# Patient Record
Sex: FEMALE | Race: ASIAN | Employment: UNEMPLOYED
[De-identification: names, ages, dates, MRNs, and addresses within clinical notes are randomized per-mention and may not be internally consistent; named-entity substitution may affect disease eponyms.]

---

## 2023-12-30 ENCOUNTER — HOSPITAL ENCOUNTER (EMERGENCY)
Facility: HOSPITAL | Age: 33
Discharge: HOME | End: 2023-12-30
Attending: STUDENT IN AN ORGANIZED HEALTH CARE EDUCATION/TRAINING PROGRAM
Payer: OTHER GOVERNMENT

## 2023-12-30 VITALS
BODY MASS INDEX: 24.75 KG/M2 | OXYGEN SATURATION: 98 % | WEIGHT: 145 LBS | RESPIRATION RATE: 18 BRPM | SYSTOLIC BLOOD PRESSURE: 119 MMHG | DIASTOLIC BLOOD PRESSURE: 74 MMHG | HEART RATE: 94 BPM | HEIGHT: 64 IN | TEMPERATURE: 98.4 F

## 2023-12-30 DIAGNOSIS — R06.2 WHEEZING: ICD-10-CM

## 2023-12-30 DIAGNOSIS — J06.9 UPPER RESPIRATORY TRACT INFECTION, UNSPECIFIED TYPE: Primary | ICD-10-CM

## 2023-12-30 PROCEDURE — 6370000000 HC RX 637 (ALT 250 FOR IP): Performed by: STUDENT IN AN ORGANIZED HEALTH CARE EDUCATION/TRAINING PROGRAM

## 2023-12-30 PROCEDURE — 99283 EMERGENCY DEPT VISIT LOW MDM: CPT

## 2023-12-30 RX ORDER — ALBUTEROL SULFATE 90 UG/1
2 AEROSOL, METERED RESPIRATORY (INHALATION) 4 TIMES DAILY PRN
Qty: 18 G | Refills: 0 | Status: SHIPPED | OUTPATIENT
Start: 2023-12-30

## 2023-12-30 RX ORDER — IPRATROPIUM BROMIDE AND ALBUTEROL SULFATE 2.5; .5 MG/3ML; MG/3ML
1 SOLUTION RESPIRATORY (INHALATION)
Status: COMPLETED | OUTPATIENT
Start: 2023-12-30 | End: 2023-12-30

## 2023-12-30 RX ORDER — PREDNISONE 50 MG/1
50 TABLET ORAL DAILY
Qty: 5 TABLET | Refills: 0 | Status: SHIPPED | OUTPATIENT
Start: 2023-12-30 | End: 2024-01-04

## 2023-12-30 RX ORDER — DOXYCYCLINE HYCLATE 100 MG
100 TABLET ORAL 2 TIMES DAILY
Qty: 14 TABLET | Refills: 0 | Status: SHIPPED | OUTPATIENT
Start: 2023-12-30 | End: 2024-01-06

## 2023-12-30 RX ORDER — PREDNISONE 20 MG/1
60 TABLET ORAL
Status: COMPLETED | OUTPATIENT
Start: 2023-12-30 | End: 2023-12-30

## 2023-12-30 RX ADMIN — IPRATROPIUM BROMIDE AND ALBUTEROL SULFATE 1 DOSE: 2.5; .5 SOLUTION RESPIRATORY (INHALATION) at 13:50

## 2023-12-30 RX ADMIN — PREDNISONE 60 MG: 20 TABLET ORAL at 13:50

## 2023-12-30 ASSESSMENT — LIFESTYLE VARIABLES
HOW MANY STANDARD DRINKS CONTAINING ALCOHOL DO YOU HAVE ON A TYPICAL DAY: 1 OR 2
HOW OFTEN DO YOU HAVE A DRINK CONTAINING ALCOHOL: MONTHLY OR LESS

## 2023-12-30 ASSESSMENT — PAIN - FUNCTIONAL ASSESSMENT: PAIN_FUNCTIONAL_ASSESSMENT: NONE - DENIES PAIN

## 2023-12-30 NOTE — ED TRIAGE NOTES
Pt reports cough that began 1 week ago and now with sore throat x 2 days. Pt thinks sore throat is related to inhaler she has been using that is . Requesting new script for inhaler.

## 2024-01-31 ENCOUNTER — HOSPITAL ENCOUNTER (EMERGENCY)
Facility: HOSPITAL | Age: 34
Discharge: HOME | End: 2024-01-31
Payer: OTHER GOVERNMENT

## 2024-01-31 VITALS
SYSTOLIC BLOOD PRESSURE: 124 MMHG | DIASTOLIC BLOOD PRESSURE: 85 MMHG | OXYGEN SATURATION: 100 % | HEART RATE: 99 BPM | TEMPERATURE: 98.8 F | HEIGHT: 64 IN | BODY MASS INDEX: 25.61 KG/M2 | RESPIRATION RATE: 16 BRPM | WEIGHT: 150 LBS

## 2024-01-31 DIAGNOSIS — L30.9 DERMATITIS: Primary | ICD-10-CM

## 2024-01-31 PROCEDURE — 99283 EMERGENCY DEPT VISIT LOW MDM: CPT

## 2024-01-31 RX ORDER — PREDNISONE 20 MG/1
40 TABLET ORAL DAILY
Qty: 10 TABLET | Refills: 0 | Status: SHIPPED | OUTPATIENT
Start: 2024-01-31 | End: 2024-02-05

## 2024-01-31 RX ORDER — TRIAMCINOLONE ACETONIDE 1 MG/G
CREAM TOPICAL
Qty: 15 G | Refills: 0 | Status: SHIPPED | OUTPATIENT
Start: 2024-01-31

## 2024-01-31 ASSESSMENT — PAIN - FUNCTIONAL ASSESSMENT: PAIN_FUNCTIONAL_ASSESSMENT: 0-10

## 2024-01-31 ASSESSMENT — PAIN SCALES - GENERAL: PAINLEVEL_OUTOF10: 0

## 2024-01-31 NOTE — ED TRIAGE NOTES
Rash started after antibiotics where finished for asthma,  approx 4 wks ago.  Rash is to both axillary, then started on inner thigh, used vicks vapor on axillary area says it soothed it and then got worse.  No airway issue, cough x 2-3 months.  Pt doesn't wear deodorant.  Benadryl few wks ago and didn't helped only took 2 times. Light expiratory wheeze to all lung fields

## 2024-02-03 NOTE — ED PROVIDER NOTES
[facility] EMERGENCY DEPT  EMERGENCY DEPARTMENT HISTORY AND PHYSICAL EXAM      Date: 1/31/2024  Patient Name: Radha Sr  MRN: [id number]  YOB: 1990  Date of evaluation: 1/31/2024  Provider: ORVILLE Cuevas NP   Note Started: 2:34 PM EST 2/3/24    HISTORY OF PRESENT ILLNESS     Chief Complaint   Patient presents with    Rash       History Provided By: Patient    HPI: Radha Sr is a 33 y.o. female who reports no past medical history, presents ambulatory to the emergency department complaints of rash to her bilateral axilla and groin after taking antibiotics for a URI.  She has been complaining of the rash and itchiness for several weeks, worse over the last several days.  Denies using any new products such as detergents, lotions, deodorants or anything else to these areas with the rash is located.  Denies mucosal rashes, fever/chills and is otherwise in her usual state of health. Upon arrival to the ED pt is alert and oriented x 3, well-appearing, and interacting appropriately; no obvious distress noted.      PAST MEDICAL HISTORY   Past Medical History:  History reviewed. No pertinent past medical history.    Past Surgical History:  History reviewed. No pertinent surgical history.    Family History:  History reviewed. No pertinent family history.    Social History:  Social History     Tobacco Use    Smoking status: Never    Smokeless tobacco: Never   Substance Use Topics    Alcohol use: Yes       Allergies:  Allergies   Allergen Reactions    Shellfish-Derived Products        PCP: None, None    Current Meds:   No current facility-administered medications for this encounter.     Current Outpatient Medications   Medication Sig Dispense Refill    triamcinolone (KENALOG) 0.1 % cream Apply topically 2 times daily. 15 g 0    predniSONE (DELTASONE) 20 MG tablet Take 2 tablets by mouth daily for 5 days 10 tablet 0    albuterol sulfate HFA (VENTOLIN HFA) 108 (90 Base) MCG/ACT inhaler Inhale 2 puffs into the

## 2025-02-27 ENCOUNTER — HOSPITAL ENCOUNTER (EMERGENCY)
Facility: HOSPITAL | Age: 35
Discharge: HOME | End: 2025-02-27
Payer: OTHER GOVERNMENT

## 2025-02-27 VITALS
DIASTOLIC BLOOD PRESSURE: 68 MMHG | RESPIRATION RATE: 16 BRPM | HEIGHT: 64 IN | WEIGHT: 150 LBS | OXYGEN SATURATION: 98 % | BODY MASS INDEX: 25.61 KG/M2 | SYSTOLIC BLOOD PRESSURE: 121 MMHG | HEART RATE: 74 BPM | TEMPERATURE: 97.7 F

## 2025-02-27 DIAGNOSIS — T65.91XA ALLERGIC REACTION TO CHEMICAL SUBSTANCE, ACCIDENTAL OR UNINTENTIONAL, INITIAL ENCOUNTER: Primary | ICD-10-CM

## 2025-02-27 PROCEDURE — 99283 EMERGENCY DEPT VISIT LOW MDM: CPT

## 2025-02-27 RX ORDER — LORATADINE 10 MG/1
10 TABLET ORAL DAILY
Qty: 90 TABLET | Refills: 2 | Status: SHIPPED | OUTPATIENT
Start: 2025-02-27 | End: 2025-05-28

## 2025-02-27 RX ORDER — ALBUTEROL SULFATE 90 UG/1
2 INHALANT RESPIRATORY (INHALATION) 4 TIMES DAILY PRN
Qty: 18 G | Refills: 2 | Status: SHIPPED | OUTPATIENT
Start: 2025-02-27

## 2025-02-27 ASSESSMENT — PAIN SCALES - GENERAL: PAINLEVEL_OUTOF10: 4

## 2025-02-27 ASSESSMENT — PAIN DESCRIPTION - DESCRIPTORS: DESCRIPTORS: SORE

## 2025-02-27 ASSESSMENT — PAIN DESCRIPTION - LOCATION: LOCATION: THROAT

## 2025-02-27 ASSESSMENT — PAIN DESCRIPTION - PAIN TYPE: TYPE: ACUTE PAIN

## 2025-02-27 ASSESSMENT — PAIN - FUNCTIONAL ASSESSMENT
PAIN_FUNCTIONAL_ASSESSMENT: 0-10
PAIN_FUNCTIONAL_ASSESSMENT: ACTIVITIES ARE NOT PREVENTED

## 2025-02-27 NOTE — ED PROVIDER NOTES
Continue losartan 100mg and HCTZ 25 mg, with hydralazine 25 mg PO q8h     [facility] EMERGENCY DEPT  EMERGENCY DEPARTMENT HISTORY AND PHYSICAL EXAM      Date: 2/27/2025  Patient Name: Radha Sr  MRN: [id number]  YOB: 1990  Date of evaluation: 2/27/2025  Provider: ORVILLE Rojas NP   Note Started: 2:10 PM EST 2/27/25    HISTORY OF PRESENT ILLNESS     Chief Complaint   Patient presents with    Allergic Reaction       History Provided By: Patient    HPI: Radha Sr is a 34 y.o. female with a past medical history of asthma presents to the ER for possible allergic reaction.  Patient had hives last night with a cough.  Patient states she sprayed a Lysol type  prior to having the reaction.  Patient took Benadryl.  Patient comes in for evaluation.    PAST MEDICAL HISTORY   Past Medical History:  No past medical history on file.    Past Surgical History:  No past surgical history on file.    Family History:  No family history on file.    Social History:  Social History     Tobacco Use    Smoking status: Never    Smokeless tobacco: Never   Substance Use Topics    Alcohol use: Yes       Allergies:  Allergies   Allergen Reactions    Shellfish-Derived Products        PCP: None, None    Current Meds:   No current facility-administered medications for this encounter.     Current Outpatient Medications   Medication Sig Dispense Refill    albuterol sulfate HFA (VENTOLIN HFA) 108 (90 Base) MCG/ACT inhaler Inhale 2 puffs into the lungs 4 times daily as needed for Wheezing 18 g 2    loratadine (CLARITIN) 10 MG tablet Take 1 tablet by mouth daily 90 tablet 2    triamcinolone (KENALOG) 0.1 % cream Apply topically 2 times daily. 15 g 0       Social Determinants of Health:   Social Determinants of Health     Tobacco Use: Low Risk  (1/31/2024)    Patient History     Smoking Tobacco Use: Never     Smokeless Tobacco Use: Never     Passive Exposure: Not on file   Alcohol Use: Not At Risk (2/27/2025)    AUDIT-C     Frequency of Alcohol Consumption: Monthly or less     Average Number of

## 2025-02-27 NOTE — ED TRIAGE NOTES
Pt presents to ED reporting having an allergic reaction last night. Pt used a lysol type  and sprayed in on her bed and then last night had hives, wheezing and itching. Pt took two benadryl last night. Pt now reporting a sore throat this morning. Pt took her albuterol inhaler prior to arrival.

## 2025-04-04 ENCOUNTER — APPOINTMENT (OUTPATIENT)
Facility: HOSPITAL | Age: 35
End: 2025-04-04
Payer: OTHER GOVERNMENT

## 2025-04-04 ENCOUNTER — HOSPITAL ENCOUNTER (EMERGENCY)
Facility: HOSPITAL | Age: 35
Discharge: HOME | End: 2025-04-04
Attending: EMERGENCY MEDICINE
Payer: OTHER GOVERNMENT

## 2025-04-04 VITALS
RESPIRATION RATE: 18 BRPM | HEIGHT: 63 IN | OXYGEN SATURATION: 100 % | DIASTOLIC BLOOD PRESSURE: 95 MMHG | TEMPERATURE: 98.1 F | SYSTOLIC BLOOD PRESSURE: 121 MMHG | BODY MASS INDEX: 27.46 KG/M2 | HEART RATE: 82 BPM | WEIGHT: 155 LBS

## 2025-04-04 DIAGNOSIS — S62.645A CLOSED NONDISPLACED FRACTURE OF PROXIMAL PHALANX OF LEFT RING FINGER, INITIAL ENCOUNTER: Primary | ICD-10-CM

## 2025-04-04 PROCEDURE — 73130 X-RAY EXAM OF HAND: CPT

## 2025-04-04 PROCEDURE — 99283 EMERGENCY DEPT VISIT LOW MDM: CPT

## 2025-04-04 RX ORDER — IBUPROFEN 600 MG/1
600 TABLET, FILM COATED ORAL 3 TIMES DAILY PRN
Qty: 30 TABLET | Refills: 0 | Status: SHIPPED | OUTPATIENT
Start: 2025-04-04

## 2025-04-04 ASSESSMENT — PAIN DESCRIPTION - DESCRIPTORS: DESCRIPTORS: ACHING

## 2025-04-04 ASSESSMENT — PAIN - FUNCTIONAL ASSESSMENT: PAIN_FUNCTIONAL_ASSESSMENT: 0-10

## 2025-04-04 ASSESSMENT — PAIN SCALES - GENERAL: PAINLEVEL_OUTOF10: 5

## 2025-04-04 NOTE — ED PROVIDER NOTES
[facility] EMERGENCY DEPT  EMERGENCY DEPARTMENT HISTORY AND PHYSICAL EXAM      Date: 4/4/2025  Patient Name: Radha Sr  MRN: [id number]  YOB: 1990  Date of evaluation: 4/4/2025  Provider: Norman Chester DO   Note Started: 5:56 PM EDT 4/4/25    HISTORY OF PRESENT ILLNESS     Chief Complaint   Patient presents with    Finger Injury       History was provided by: Patient    HPI:Patient is a 35-year-old female presenting with a left hand and finger injury.  She says she was at [employer], a [employer], and she got it caught on a metal chain that was attached to a climbing apparatus.  She obtained a splint and put on her fourth finger via [facility].  Pain is worst in the 3rd and 4th digits and at the palm of the hand near the 3rd and 4th MCP joints.  Pain is limiting her range of motion.  She does not know if it is broken, sprained, or dislocated.          PAST MEDICAL HISTORY   Past Medical History:  No past medical history on file.    Past Surgical History:  No past surgical history on file.    Family History:  No family history on file.    Social History:  Social History     Tobacco Use    Smoking status: Never    Smokeless tobacco: Never   Substance Use Topics    Alcohol use: Yes       Allergies:  Allergies   Allergen Reactions    Shellfish-Derived Products        PCP: None, None    Current Meds:   No current facility-administered medications for this encounter.     Current Outpatient Medications   Medication Sig Dispense Refill    ibuprofen (ADVIL;MOTRIN) 600 MG tablet Take 1 tablet by mouth 3 times daily as needed for Pain 30 tablet 0    albuterol sulfate HFA (VENTOLIN HFA) 108 (90 Base) MCG/ACT inhaler Inhale 2 puffs into the lungs 4 times daily as needed for Wheezing 18 g 2    loratadine (CLARITIN) 10 MG tablet Take 1 tablet by mouth daily 90 tablet 2    triamcinolone (KENALOG) 0.1 % cream Apply topically 2 times daily. 15 g 0       Social Determinants of Health:   Social Drivers of Health     Tobacco Use: Low

## 2025-04-04 NOTE — ED TRIAGE NOTES
Pt reports that on Wednesday she was at [employer] and fell from a floating platform. When she fell, her left 3rd and 4th digits got caught on metal clasps. Bruising and swelling noted to fingers and hand.

## 2025-04-04 NOTE — DISCHARGE INSTRUCTIONS
Please arrange a follow-up visit with your primary care doctor.  You need to see an orthopedist that can repeat the x-ray and ensure your finger is healing appropriately.  I would keep the finger splint on until you follow-up with either your primary care doctor or the orthopedist.  Your primary care doctor can refer you to a specialist if it is needed but have included a few or you can call to arrange a follow-up visit.    Immobilization is important so that it does not become significantly displaced.  This will likely need to be followed up by a specialist, to retain function of your finger and hand appropriately.        Thank you for choosing our Emergency Department for your care.  It is our privilege to care for you in your time of need.  In the next several days, you may receive a survey via email or mailed to your home about your experience with our team.  We would greatly appreciate you taking a few minutes to complete the survey, as we use this information to learn what we have done well and what we could be doing better. Thank you for trusting us with your care!    Below you will find a list of your tests from today's visit.   Labs and Radiology Studies  No results found for this or any previous visit (from the past 12 hours).  No results found.  ------------------------------------------------------------------------------------------------------------  The evaluation and treatment you received in the Emergency Department were for an urgent problem. It is important that you follow-up with a doctor, nurse practitioner, or physician assistant to:  (1) confirm your diagnosis,  (2) re-evaluation of changes in your illness and treatment, and (3) for ongoing care. Please take your discharge instructions with you when you go to your follow-up appointment.     If you have any problem arranging a follow-up appointment, contact us!  If your symptoms become worse or you do not improve as expected, please return to

## 2025-04-10 ENCOUNTER — OFFICE VISIT (OUTPATIENT)
Age: 35
End: 2025-04-10
Payer: OTHER GOVERNMENT

## 2025-04-10 VITALS
WEIGHT: 155 LBS | DIASTOLIC BLOOD PRESSURE: 70 MMHG | HEART RATE: 91 BPM | BODY MASS INDEX: 27.46 KG/M2 | SYSTOLIC BLOOD PRESSURE: 105 MMHG | OXYGEN SATURATION: 98 % | HEIGHT: 63 IN
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
## 2025-04-10 DIAGNOSIS — S62.615A DISPLACED FRACTURE OF PROXIMAL PHALANX OF LEFT RING FINGER, INITIAL ENCOUNTER FOR CLOSED FRACTURE: Primary | ICD-10-CM

## 2025-04-10 PROCEDURE — 99204 OFFICE O/P NEW MOD 45 MIN: CPT | Performed by: ORTHOPAEDIC SURGERY

## 2025-04-10 NOTE — PROGRESS NOTES
Identified pt with two pt identifiers (name and ). Reviewed chart in preparation for visit and have obtained necessary documentation.     Radha Sr is a 35 y.o. female New Patient (NP left hand , was out with her kids and fell down and caught her hand on a metal chain , went to the Ed and they told her she FX her finger and possibly the knuckle , she is having some numbness in her hand and will have to wiggle her free finger to make it stop , she has some shooting pain in her hand to her wrist , she is wearing a brace for her hand currently )  .     Vitals:    04/10/25 0908   BP: 105/70   BP Site: Right Upper Arm   Patient Position: Sitting   BP Cuff Size: Large Adult   Pulse: 91   SpO2: 98%   Weight: 70.3 kg (155 lb)   Height: 1.6 m (5' 3\")           1. Have you been to the ER, urgent care clinic since your last visit?  Hospitalized since your last visit?  no    2. Have you seen or consulted any other health care providers outside of the [facility] since your last visit?  Include any pap smears or colon screening.  no       
splint and there are no lacerations or abrasions there.  There is moderate swelling to the proximal phalanx.  Fourth and fifth fingers are immobilized in the splint.    Peripheral vascular: She has good capillary refill to the fourth finger.  Neurological: Patient can discriminate touch.  T    The thumb, index, and middle fingers are normal.    Assessment:     1. Displaced fracture of proximal phalanx of left ring finger, initial encounter for closed fracture    2. Accidental fall from playground equipment, initial encounter          Plan:     I have recommended referral to a hand surgeon as soon as possible for possible ORIF.  All questions were answered.